# Patient Record
Sex: FEMALE | URBAN - METROPOLITAN AREA
[De-identification: names, ages, dates, MRNs, and addresses within clinical notes are randomized per-mention and may not be internally consistent; named-entity substitution may affect disease eponyms.]

---

## 2020-07-13 ENCOUNTER — APPOINTMENT (RX ONLY)
Dept: URBAN - METROPOLITAN AREA CLINIC 35 | Facility: CLINIC | Age: 45
Setting detail: DERMATOLOGY
End: 2020-07-13

## 2020-07-13 DIAGNOSIS — L20.89 OTHER ATOPIC DERMATITIS: ICD-10-CM

## 2020-07-13 DIAGNOSIS — L259 CONTACT DERMATITIS AND OTHER ECZEMA, UNSPECIFIED CAUSE: ICD-10-CM

## 2020-07-13 PROBLEM — L23.9 ALLERGIC CONTACT DERMATITIS, UNSPECIFIED CAUSE: Status: ACTIVE | Noted: 2020-07-13

## 2020-07-13 PROBLEM — D23.39 OTHER BENIGN NEOPLASM OF SKIN OF OTHER PARTS OF FACE: Status: ACTIVE | Noted: 2020-07-13

## 2020-07-13 PROCEDURE — ? PRESCRIPTION MEDICATION MANAGEMENT

## 2020-07-13 PROCEDURE — ? COUNSELING

## 2020-07-13 PROCEDURE — 99203 OFFICE O/P NEW LOW 30 MIN: CPT

## 2020-07-13 PROCEDURE — ? DIAGNOSIS COMMENT

## 2020-07-13 PROCEDURE — ? PRESCRIPTION

## 2020-07-13 RX ORDER — TACROLIMUS 1 MG/G
OINTMENT TOPICAL
Qty: 1 | Refills: 8 | Status: ERX | COMMUNITY
Start: 2020-07-13

## 2020-07-13 RX ORDER — CLOBETASOL PROPIONATE 0.5 MG/G
OINTMENT TOPICAL
Qty: 1 | Refills: 8 | Status: ERX | COMMUNITY
Start: 2020-07-13

## 2020-07-13 RX ADMIN — TACROLIMUS: 1 OINTMENT TOPICAL at 00:00

## 2020-07-13 RX ADMIN — CLOBETASOL PROPIONATE: 0.5 OINTMENT TOPICAL at 00:00

## 2020-07-13 ASSESSMENT — LOCATION SIMPLE DESCRIPTION DERM
LOCATION SIMPLE: RIGHT MIDDLE FINGER
LOCATION SIMPLE: RIGHT LIP
LOCATION SIMPLE: LEFT MIDDLE FINGER

## 2020-07-13 ASSESSMENT — LOCATION DETAILED DESCRIPTION DERM
LOCATION DETAILED: RIGHT PROXIMAL DORSAL MIDDLE FINGER
LOCATION DETAILED: LEFT PROXIMAL DORSAL MIDDLE FINGER
LOCATION DETAILED: RIGHT UPPER CUTANEOUS LIP

## 2020-07-13 ASSESSMENT — PAIN INTENSITY VAS: HOW INTENSE IS YOUR PAIN 0 BEING NO PAIN, 10 BEING THE MOST SEVERE PAIN POSSIBLE?: NO PAIN

## 2020-07-13 ASSESSMENT — LOCATION ZONE DERM
LOCATION ZONE: FINGER
LOCATION ZONE: LIP

## 2020-07-13 NOTE — PROCEDURE: PRESCRIPTION MEDICATION MANAGEMENT
Render In Strict Bullet Format?: No
Initiate Treatment: Clobetasol ointment QHS 1 week then tacrolimus BID for 2-4 weeks
Detail Level: Zone

## 2020-07-13 NOTE — HPI: RASH
What Type Of Note Output Would You Prefer (Optional)?: Bullet Format
Is This A New Presentation, Or A Follow-Up?: Rash
Additional History: Pt also notes a similar rash on her upper lip.

## 2020-09-14 ENCOUNTER — APPOINTMENT (RX ONLY)
Dept: URBAN - METROPOLITAN AREA CLINIC 35 | Facility: CLINIC | Age: 45
Setting detail: DERMATOLOGY
End: 2020-09-14

## 2020-09-14 DIAGNOSIS — L30.8 OTHER SPECIFIED DERMATITIS: ICD-10-CM

## 2020-09-14 DIAGNOSIS — L259 CONTACT DERMATITIS AND OTHER ECZEMA, UNSPECIFIED CAUSE: ICD-10-CM

## 2020-09-14 DIAGNOSIS — D22 MELANOCYTIC NEVI: ICD-10-CM

## 2020-09-14 DIAGNOSIS — L20.89 OTHER ATOPIC DERMATITIS: ICD-10-CM

## 2020-09-14 PROBLEM — L23.9 ALLERGIC CONTACT DERMATITIS, UNSPECIFIED CAUSE: Status: ACTIVE | Noted: 2020-09-14

## 2020-09-14 PROBLEM — D22.72 MELANOCYTIC NEVI OF LEFT LOWER LIMB, INCLUDING HIP: Status: ACTIVE | Noted: 2020-09-14

## 2020-09-14 PROCEDURE — ? OBSERVATION AND MEASURE

## 2020-09-14 PROCEDURE — ? PRESCRIPTION

## 2020-09-14 PROCEDURE — 99213 OFFICE O/P EST LOW 20 MIN: CPT

## 2020-09-14 PROCEDURE — ? DIAGNOSIS COMMENT

## 2020-09-14 PROCEDURE — ? COUNSELING

## 2020-09-14 RX ORDER — FLUOCINONIDE 0.5 MG/G
CREAM TOPICAL
Qty: 1 | Refills: 1 | Status: ERX | COMMUNITY
Start: 2020-09-14

## 2020-09-14 RX ORDER — CETIRIZINE HYDROCHLORIDE 10 MG/1
TABLET, FILM COATED ORAL
Qty: 60 | Refills: 1 | Status: ERX | COMMUNITY
Start: 2020-09-14

## 2020-09-14 RX ADMIN — FLUOCINONIDE: 0.5 CREAM TOPICAL at 00:00

## 2020-09-14 RX ADMIN — CETIRIZINE HYDROCHLORIDE: 10 TABLET, FILM COATED ORAL at 00:00

## 2020-09-14 ASSESSMENT — LOCATION SIMPLE DESCRIPTION DERM
LOCATION SIMPLE: RIGHT LIP
LOCATION SIMPLE: RIGHT MIDDLE FINGER
LOCATION SIMPLE: LEFT MIDDLE FINGER
LOCATION SIMPLE: LEFT FOOT
LOCATION SIMPLE: ABDOMEN

## 2020-09-14 ASSESSMENT — LOCATION DETAILED DESCRIPTION DERM
LOCATION DETAILED: RIGHT UPPER CUTANEOUS LIP
LOCATION DETAILED: LEFT DORSAL FOOT
LOCATION DETAILED: RIGHT PROXIMAL DORSAL MIDDLE FINGER
LOCATION DETAILED: LEFT RIB CAGE
LOCATION DETAILED: LEFT PROXIMAL DORSAL MIDDLE FINGER

## 2020-09-14 ASSESSMENT — LOCATION ZONE DERM
LOCATION ZONE: TRUNK
LOCATION ZONE: FEET
LOCATION ZONE: LIP
LOCATION ZONE: FINGER

## 2020-09-14 NOTE — PROCEDURE: OBSERVATION
Detail Level: Detailed
Size Of Lesion: 10x8mm
Morphology Per Location (Optional): Well defined blue papule with whitish scar like center, symmetrical

## 2020-09-14 NOTE — PROCEDURE: COUNSELING
Detail Level: Zone
Patient Specific Counseling (Will Not Stick From Patient To Patient): - Discussed previous tx with clobetasol and improvement of condition\\n- Advised pt use vanicream on hands after hand washing
Patient Specific Counseling (Will Not Stick From Patient To Patient): - Reviewed previous tx with clobetasol and tacrolimus, pt improved \\n- Given burning sensation pt was getting with tacrolimus and improvement, rec pt stops tacrolimus \\n- Rec applying cerave healing ointment at least once a day to previously affected areas at night and moisturizer BID
Detail Level: Detailed
Patient Specific Counseling (Will Not Stick From Patient To Patient): - Discussed etiology and potential triggers\\n- Rec fluocinonide taper to affected areas\\n- Rec oral antihistamine - Cetirizine 10mg 1-2 times daily \\n- Reviewed sensitive skin care product alternatives (Vanicream)\\n- Discussed future allergy testing vs changing basic products and monitoring sxs

## 2020-11-09 ENCOUNTER — APPOINTMENT (RX ONLY)
Dept: URBAN - METROPOLITAN AREA CLINIC 35 | Facility: CLINIC | Age: 45
Setting detail: DERMATOLOGY
End: 2020-11-09

## 2020-11-09 DIAGNOSIS — L30.8 OTHER SPECIFIED DERMATITIS: ICD-10-CM

## 2020-11-09 PROCEDURE — ? TREATMENT REGIMEN

## 2020-11-09 PROCEDURE — ? PRESCRIPTION

## 2020-11-09 PROCEDURE — ? COUNSELING

## 2020-11-09 PROCEDURE — 99213 OFFICE O/P EST LOW 20 MIN: CPT

## 2020-11-09 PROCEDURE — ? DIAGNOSIS COMMENT

## 2020-11-09 RX ORDER — TRIAMCINOLONE ACETONIDE 1 MG/G
CREAM TOPICAL
Qty: 1 | Refills: 3 | Status: ERX | COMMUNITY
Start: 2020-11-09

## 2020-11-09 RX ORDER — FLUOCINONIDE 0.5 MG/G
CREAM TOPICAL
Qty: 1 | Refills: 3 | Status: ERX

## 2020-11-09 RX ADMIN — TRIAMCINOLONE ACETONIDE: 1 CREAM TOPICAL at 00:00

## 2020-11-09 ASSESSMENT — LOCATION DETAILED DESCRIPTION DERM: LOCATION DETAILED: LEFT RIB CAGE

## 2020-11-09 ASSESSMENT — LOCATION SIMPLE DESCRIPTION DERM: LOCATION SIMPLE: ABDOMEN

## 2020-11-09 ASSESSMENT — LOCATION ZONE DERM: LOCATION ZONE: TRUNK

## 2020-11-09 NOTE — PROCEDURE: COUNSELING
Detail Level: Zone
Patient Specific Counseling (Will Not Stick From Patient To Patient): - Discussed etiology and potential triggers\\n- Discussed current treatment with fluocinonide taper and cetirizine \\n- Rec continuing cetirizine as a standing dose: 1-2x/day x1 month\\n- Rec continuing fluocinonide 1-2x/day to affected areas of trunk and arms \\n- Rec starting TAC 1-2x/day to affected areas of breasts and back\\n- Discussed future allergy testing with Dr. Leach or Dr. Mendoza in 1-2mo if rash is persistent

## 2020-11-09 NOTE — PROCEDURE: TREATMENT REGIMEN
Continue Regimen: Cetirizine 10mg as a standing dose: 1-2x/day x1 month\\nFluocinonide cream 1-2x/day to affected areas of trunk and arms
Detail Level: Zone
Initiate Treatment: TAC 1-2x/day to affected areas of breasts and back

## 2021-07-19 ENCOUNTER — RX ONLY (OUTPATIENT)
Age: 46
Setting detail: RX ONLY
End: 2021-07-19

## 2021-07-19 RX ORDER — TRIAMCINOLONE ACETONIDE 1 MG/G
CREAM TOPICAL
Qty: 1 | Refills: 1 | Status: ERX | COMMUNITY
Start: 2021-07-19

## 2021-07-19 RX ORDER — FLUOCINONIDE 0.5 MG/G
CREAM TOPICAL
Qty: 1 | Refills: 1 | Status: ERX | COMMUNITY
Start: 2021-07-19

## 2021-08-17 NOTE — PROCEDURE: DIAGNOSIS COMMENT
Detail Level: Simple Stage 2: Additional Anesthesia Type: 1% lidocaine with 1:100,000 epinephrine and a 1:10 solution of 8.4% sodium bicarbonate